# Patient Record
Sex: MALE | Race: WHITE | NOT HISPANIC OR LATINO | Employment: FULL TIME | URBAN - METROPOLITAN AREA
[De-identification: names, ages, dates, MRNs, and addresses within clinical notes are randomized per-mention and may not be internally consistent; named-entity substitution may affect disease eponyms.]

---

## 2023-02-04 ENCOUNTER — HOSPITAL ENCOUNTER (EMERGENCY)
Facility: HOSPITAL | Age: 23
Discharge: HOME | End: 2023-02-04
Attending: EMERGENCY MEDICINE | Admitting: EMERGENCY MEDICINE
Payer: COMMERCIAL

## 2023-02-04 ENCOUNTER — APPOINTMENT (OUTPATIENT)
Dept: CT IMAGING | Facility: HOSPITAL | Age: 23
End: 2023-02-04
Attending: EMERGENCY MEDICINE
Payer: COMMERCIAL

## 2023-02-04 VITALS
TEMPERATURE: 98.8 F | HEART RATE: 95 BPM | WEIGHT: 200 LBS | RESPIRATION RATE: 16 BRPM | DIASTOLIC BLOOD PRESSURE: 87 MMHG | SYSTOLIC BLOOD PRESSURE: 134 MMHG | OXYGEN SATURATION: 96 %
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
## 2023-02-04 DIAGNOSIS — S06.0XAA CONCUSSION WITH UNKNOWN LOSS OF CONSCIOUSNESS STATUS, INITIAL ENCOUNTER: ICD-10-CM

## 2023-02-04 PROCEDURE — 70450 CT HEAD/BRAIN W/O DYE: CPT

## 2023-02-04 PROCEDURE — 99284 EMERGENCY DEPT VISIT MOD MDM: CPT | Mod: 25

## 2023-02-04 ASSESSMENT — ACTIVITIES OF DAILY LIVING (ADL): ADLS_ACUITY_SCORE: 35

## 2023-02-05 NOTE — ED PROVIDER NOTES
EMERGENCY DEPARTMENT ENCOUNTER      NAME: Kemal Pond  AGE: 22 year old male  YOB: 2000  MRN: [id number]  EVALUATION DATE & TIME: 2/4/2023  6:31 PM    PCP: No primary care provider on file.    ED PROVIDER: Maisha Wilcox M.D.    Chief Complaint   Patient presents with     Trauma     FINAL IMPRESSION:  1. Concussion with unknown loss of consciousness status, initial encounter    2. Fall, initial encounter      ED COURSE & MEDICAL DECISION MAKING:    Pertinent Labs & Imaging studies reviewed. (See chart for details)  ED Course as of 02/04/23 2017   Sat Feb 04, 2023 1910 Patient is a 22-year-old male comes in today for evaluation of head trauma.  He was snowboarding and fell.  He does not remember the fall or anything for a period of time after that.  It sounds like he was doing some repetitive speech.  He was wearing a helmet.  He has an abrasion to his nose and his upper lip but otherwise no signs of trauma.  He has no midline neck tenderness.  He took some Advil prior to coming in.  He still has a little bit of a headache.  He does not feel quite like his normal self.  He thinks he is slightly confused  or just taking longer to answer questions.  My suspicion is that he just has a concussion but it sounds like a pretty significant mechanism so I think it is reasonable to get CT imaging to make sure he does not have a brain bleed.  I discussed this with him and his significant other.  She was able to provide some history as well and she reports that the repetitive speech occurred in [place] but has since improved.   1946 CT scan came back unremarkable.  I will discuss this with the patient and work on getting him discharged home.   1950 I discussed results and plan for discharge home with the patient and family and they are in agreement.     6:57 PM I met with the patient to gather history and to perform my initial exam. I discussed the plan for care while in the Emergency Department. PPE  (gloves, surgical cap, N95 mask) was worn during patient encounters.   7:49 PM I re-evaluated the patient and updated on imaging results. I discussed a plan for discharge with the patient, and patient is agreeable. We discussed supportive cares at home and reasons for return to the ER, including new or worsening symptoms - all questions and concerns addressed. Discharged patient in stable condition.     Medical Decision Making    History:    Supplemental history from: Friend    External Record(s) reviewed: Documented in chart, if applicable.    Work Up:    Chart documentation includes differential considered and any EKGs or imaging independently interpreted by provider, where specified.    In additional to work up documented, I considered the following work up: Documented in chart, if applicable.    External consultation:    Discussion of management with another provider: Documented in chart, if applicable    Complicating factors:    Care impacted by chronic illness: N/A    Care affected by social determinants of health: N/A    Disposition considerations: Discharge. No recommendations on prescription strength medication(s). N/A.        At the conclusion of the encounter I discussed  the results of all of the tests and the disposition with patient.   All questions were answered.  The patient acknowledged understanding and was involved in the decision making regarding the overall care plan.      I discussed with patient the utility, limitations and findings of the exam/interventions/studies done during this visit as well as the list of differential diagnosis and symptoms to monitor/return to ER for.  Additional verbal discharge instructions were provided.     MEDICATIONS GIVEN IN THE EMERGENCY:  Medications - No data to display    NEW PRESCRIPTIONS STARTED AT TODAY'S ER VISIT  There are no discharge medications for this patient.         =================================================================    HPI    Triage  "Note:   Pt was snowboarding on a \"green\" hill when he fell. Pt doesn't remember the fall or anything for 15 minutes. Pt was repetitive during that 15 minutes. PT denies blood thinners. Pt was wearing a helmet. Pt took two advil PTA. Pt c/o 5/10 headache.      Triage Assessment     Row Name 02/04/23 0471       Triage Assessment (Adult)    Airway WDL WDL       Respiratory WDL    Respiratory WDL WDL       Skin Circulation/Temperature WDL    Skin Circulation/Temperature WDL WDL       Cardiac WDL    Cardiac WDL WDL       Peripheral/Neurovascular WDL    Peripheral Neurovascular WDL WDL       Cognitive/Neuro/Behavioral WDL    Cognitive/Neuro/Behavioral WDL WDL              Patient information was obtained from: patient, patient's friend    Use of : N/A         Kemal Pond is a 22 year old male with no contributory medical history who presents to the ED by private vehicle with friend for evaluation of trauma. Patient reports that he fell while snowboarding, and that he was wearing a helmet. He states that he does not remember the fall, or anything for the next ~15 minutes, but was conscious, and able [place]. He states that he is able to remember \"becoming aware\" [place], and going to , who said he likely had a concussion. Patient went home, and his mother encouraged him to present to the ED.    He states that now, he feels \"spacey\" and \"out of it.\" He reports a headache, nausea, and dizziness. He also states that his neck is sore. Patient reports abrasions to his nose and his lip. Patient took 2 ibuprofen for the pain. Patient denies additional medical concerns or complaints at this time.     Patient's friend reports that the patient was repeating himself to her immediately following the fall, and repeating the same questions.    PAST MEDICAL HISTORY:  History reviewed. No pertinent past medical history.    PAST SURGICAL HISTORY:  History reviewed. No " pertinent surgical history.    CURRENT MEDICATIONS:    No current facility-administered medications for this encounter.  No current outpatient medications on file.    ALLERGIES:  No Known Allergies    FAMILY HISTORY:  History reviewed. No pertinent family history.    SOCIAL HISTORY:        PHYSICAL EXAM    VITAL SIGNS: /87   Pulse 95   Temp 98.8  F (37.1  C) (Temporal)   Resp 16   Wt 90.7 kg (200 lb)   SpO2 96%    GENERAL: Awake, alert, answering questions appropriately, pupils equally round.  Abrasion to the bridge of the nose and the upper lip.  No tenderness to the neck.  SPEECH:  Easy to understand speech, Normal volume and melissa  PULMONARY: No respiratory distress, Lungs clear to auscultation bilaterally  CARDIOVASCULAR: Regular rate and rhythm, Distal pulses present and normal.  EXTREMITIES: No lower extremity edema.  PSYCH: Normal mood and affect     RADIOLOGY:  Head CT w/o contrast   Final Result   IMPRESSION:   1.  Normal head CT.          I, Elise Gryler, am serving as a scribe to document services personally performed by Dr. Wilcox based on my observation and the provider's statements to me. I, Maisha Wilcox MD attest that Elise Gryler is acting in a scribe capacity, has observed my performance of the services and has documented them in accordance with my direction.    Maisha Wilcox M.D.  Emergency Medicine  [facility] EMERGENCY DEPARTMENT  [facility] [facility] [id number] [phone]  Dept: [phone]       Maisha Wilcox MD  02/04/23 2021

## 2023-02-05 NOTE — DISCHARGE INSTRUCTIONS
You were seen in the Emergency Department today for evaluation after head injury.  Your imaging studies showed no bleeding on the brain.  Symptoms and mechanism are consistent with concussion.  Take Tylenol or ibuprofen as needed for pain.  Follow up with your primary care physician to ensure resolution of symptoms. Return if you have new or worsening symptoms.

## 2023-02-05 NOTE — ED TRIAGE NOTES
"Pt was snowboarding on a \"green\" hill when he fell. Pt doesn't remember the fall or anything for 15 minutes. Pt was repetitive during that 15 minutes. PT denies blood thinners. Pt was wearing a helmet. Pt took two advil PTA. Pt c/o 5/10 headache.      Triage Assessment     Row Name 02/04/23 2476       Triage Assessment (Adult)    Airway WDL WDL       Respiratory WDL    Respiratory WDL WDL       Skin Circulation/Temperature WDL    Skin Circulation/Temperature WDL WDL       Cardiac WDL    Cardiac WDL WDL       Peripheral/Neurovascular WDL    Peripheral Neurovascular WDL WDL       Cognitive/Neuro/Behavioral WDL    Cognitive/Neuro/Behavioral WDL WDL              "

## 2023-05-21 ENCOUNTER — HEALTH MAINTENANCE LETTER (OUTPATIENT)
Age: 23
End: 2023-05-21

## 2024-07-28 ENCOUNTER — HEALTH MAINTENANCE LETTER (OUTPATIENT)
Age: 24
End: 2024-07-28

## 2025-08-10 ENCOUNTER — HEALTH MAINTENANCE LETTER (OUTPATIENT)
Age: 25
End: 2025-08-10